# Patient Record
(demographics unavailable — no encounter records)

---

## 2024-11-01 NOTE — HEALTH RISK ASSESSMENT
[No] : In the past 12 months have you used drugs other than those required for medical reasons? No [No falls in past year] : Patient reported no falls in the past year [0] : 2) Feeling down, depressed, or hopeless: Not at all (0) [PHQ-2 Negative - No further assessment needed] : PHQ-2 Negative - No further assessment needed [Never] : Never [Audit-CScore] : 0 [de-identified] : Walks [de-identified] : Normal [[id number]] : 0

## 2024-11-01 NOTE — REVIEW OF SYSTEMS
[Fatigue] : fatigue [Nasal Discharge] : nasal discharge [Postnasal Drip] : postnasal drip [Cough] : cough [Fever] : no fever [Chills] : no chills

## 2024-11-01 NOTE — PHYSICAL EXAM
[Normal Sclera/Conjunctiva] : normal sclera/conjunctiva [Normal Outer Ear/Nose] : the outer ears and nose were normal in appearance [Normal Oropharynx] : the oropharynx was normal [Normal TMs] : both tympanic membranes were normal [Normal Nasal Mucosa] : the nasal mucosa was normal [No Lymphadenopathy] : no lymphadenopathy [Normal] : normal rate, regular rhythm, normal S1 and S2 and no murmur heard [No Edema] : there was no peripheral edema

## 2024-11-01 NOTE — HISTORY OF PRESENT ILLNESS
[FreeTextEntry8] : Congestion started on Monday with chills and fatigue. No aches runny nose a few days later, improved but still tired Coughing at 3am, dry. Took Corcidin which helped Started Fluticasone on Monday  COVID negative Grandson in pre-school with runny nose saw over the weekend

## 2024-11-04 NOTE — ASSESSMENT
[FreeTextEntry1] : due for BD, rx given Has rx for mammo from GYN up to date with colonoscopy Advised flu, covid, and prevnar 20 vaccines

## 2024-11-04 NOTE — HISTORY OF PRESENT ILLNESS
[de-identified] : Patient is a 74F with atrial fibrillation (on Eliquis), allergic asthma, osteopenia, HTN, HLD, prediabetes, colon polyps presents today for  Wellness still with congestion since last monday (now 1 week) On Fluticasone and was on mucinex. Taking claritin, not really helping. cough with some mucous  Ablation was 10/4/24 Dr. lackey at [facility] Will be seeing in 2 weeks  Has been walking for exercise, No resistance  Ortho: Dr. Avina at [facility]  RSV - last year had reaction with elevated HR. Went to [facility] in 2021 had tdap in 2022

## 2024-11-04 NOTE — HISTORY OF PRESENT ILLNESS
[de-identified] : Patient is a 74F with atrial fibrillation (on Eliquis), allergic asthma, osteopenia, HTN, HLD, prediabetes, colon polyps presents today for  Wellness still with congestion since last monday (now 1 week) On Fluticasone and was on mucinex. Taking claritin, not really helping. cough with some mucous  Ablation was 10/4/24 Dr. lackey at [facility] Will be seeing in 2 weeks  Has been walking for exercise, No resistance  Ortho: Dr. Avina at [facility]  RSV - last year had reaction with elevated HR. Went to [facility] in 2021 had tdap in 2022

## 2024-11-04 NOTE — HEALTH RISK ASSESSMENT
[Very Good] : ~his/her~  mood as very good [No] : In the past 12 months have you used drugs other than those required for medical reasons? No [No falls in past year] : Patient reported no falls in the past year [0] : 2) Feeling down, depressed, or hopeless: Not at all (0) [PHQ-2 Negative - No further assessment needed] : PHQ-2 Negative - No further assessment needed [Never] : Never [NO] : No [Patient reported mammogram was normal] : Patient reported mammogram was normal [Patient reported PAP Smear was normal] : Patient reported PAP Smear was normal [Patient reported bone density results were abnormal] : Patient reported bone density results were abnormal [Patient reported colonoscopy was abnormal] : Patient reported colonoscopy was abnormal [HIV test declined] : HIV test declined [Hepatitis C test declined] : Hepatitis C test declined [None] : None [With Family] : lives with family [Retired] : retired [College] : College [] :  [# Of Children ___] : has [unfilled] children [Fully functional (bathing, dressing, toileting, transferring, walking, feeding)] : Fully functional (bathing, dressing, toileting, transferring, walking, feeding) [Fully functional (using the telephone, shopping, preparing meals, housekeeping, doing laundry, using] : Fully functional and needs no help or supervision to perform IADLs (using the telephone, shopping, preparing meals, housekeeping, doing laundry, using transportation, managing medications and managing finances) [Smoke Detector] : smoke detector [Carbon Monoxide Detector] : carbon monoxide detector [Safety elements used in home] : safety elements used in home [Seat Belt] :  uses seat belt [Audit-CScore] : 0 [de-identified] : Walks [de-identified] : Normal [[id number]] : 0 [Change in mental status noted] : No change in mental status noted [Language] : denies difficulty with language [Behavior] : denies difficulty with behavior [Learning/Retaining New Information] : denies difficulty learning/retaining new information [Handling Complex Tasks] : denies difficulty handling complex tasks [Reasoning] : denies difficulty with reasoning [Spatial Ability and Orientation] : denies difficulty with spatial ability and orientation [Reports changes in hearing] : Reports no changes in hearing [Reports changes in vision] : Reports no changes in vision [Reports changes in dental health] : Reports no changes in dental health [MammogramDate] : 10/27/23 [MammogramComments] : in 2 weeks, has rx [PapSmearDate] : 10/22 [BoneDensityDate] : 10/22 [BoneDensityComments] : osteopenia [ColonoscopyDate] : 11/19 [ColonoscopyComments] : 5 year follow up, will do in 2025 Dr. Bolton [de-identified] : Last exam 09/2024 [de-identified] : Last exam 06/2024 [AdvancecareDate] : 05/17/2023

## 2024-11-04 NOTE — PHYSICAL EXAM
[Normal Sclera/Conjunctiva] : normal sclera/conjunctiva [Normal Outer Ear/Nose] : the outer ears and nose were normal in appearance [Normal Oropharynx] : the oropharynx was normal [Normal TMs] : both tympanic membranes were normal [Normal Nasal Mucosa] : the nasal mucosa was normal [No Lymphadenopathy] : no lymphadenopathy [No Edema] : there was no peripheral edema [No Rash] : no rash [Normal] : affect was normal and insight and judgment were intact

## 2024-11-04 NOTE — HEALTH RISK ASSESSMENT
[Very Good] : ~his/her~  mood as very good [No] : In the past 12 months have you used drugs other than those required for medical reasons? No [No falls in past year] : Patient reported no falls in the past year [0] : 2) Feeling down, depressed, or hopeless: Not at all (0) [PHQ-2 Negative - No further assessment needed] : PHQ-2 Negative - No further assessment needed [Never] : Never [NO] : No [Patient reported mammogram was normal] : Patient reported mammogram was normal [Patient reported PAP Smear was normal] : Patient reported PAP Smear was normal [Patient reported bone density results were abnormal] : Patient reported bone density results were abnormal [Patient reported colonoscopy was abnormal] : Patient reported colonoscopy was abnormal [HIV test declined] : HIV test declined [Hepatitis C test declined] : Hepatitis C test declined [None] : None [With Family] : lives with family [Retired] : retired [College] : College [] :  [# Of Children ___] : has [unfilled] children [Fully functional (bathing, dressing, toileting, transferring, walking, feeding)] : Fully functional (bathing, dressing, toileting, transferring, walking, feeding) [Fully functional (using the telephone, shopping, preparing meals, housekeeping, doing laundry, using] : Fully functional and needs no help or supervision to perform IADLs (using the telephone, shopping, preparing meals, housekeeping, doing laundry, using transportation, managing medications and managing finances) [Smoke Detector] : smoke detector [Carbon Monoxide Detector] : carbon monoxide detector [Safety elements used in home] : safety elements used in home [Seat Belt] :  uses seat belt [Audit-CScore] : 0 [de-identified] : Walks [de-identified] : Normal [[id number]] : 0 [Change in mental status noted] : No change in mental status noted [Language] : denies difficulty with language [Behavior] : denies difficulty with behavior [Learning/Retaining New Information] : denies difficulty learning/retaining new information [Handling Complex Tasks] : denies difficulty handling complex tasks [Reasoning] : denies difficulty with reasoning [Spatial Ability and Orientation] : denies difficulty with spatial ability and orientation [Reports changes in hearing] : Reports no changes in hearing [Reports changes in vision] : Reports no changes in vision [Reports changes in dental health] : Reports no changes in dental health [MammogramDate] : 10/27/23 [MammogramComments] : in 2 weeks, has rx [PapSmearDate] : 10/22 [BoneDensityDate] : 10/22 [BoneDensityComments] : osteopenia [ColonoscopyDate] : 11/19 [ColonoscopyComments] : 5 year follow up, will do in 2025 Dr. Bolton [de-identified] : Last exam 09/2024 [de-identified] : Last exam 06/2024 [AdvancecareDate] : 05/17/2023

## 2025-01-21 NOTE — PHYSICAL EXAM
[Chaperone Present] : A chaperone was present in the examining room during all aspects of the physical examination [[id number]] : A chaperone was present during the pelvic exam. [FreeTextEntry2] : Anna [Appropriately responsive] : appropriately responsive [Alert] : alert [No Acute Distress] : no acute distress [Soft] : soft [Non-tender] : non-tender [Non-distended] : non-distended [Oriented x3] : oriented x3 [Examination Of The Breasts] : a normal appearance [No Masses] : no breast masses were palpable [Labia Majora] : normal [Labia Minora] : normal [Atrophy] : atrophy [Uterine Prolapse] : uterine prolapse [No Bleeding] : There was no active vaginal bleeding [Normal] : normal [Tenderness] : nontender [Uterine Adnexae] : non-palpable

## 2025-01-21 NOTE — HISTORY OF PRESENT ILLNESS
[FreeTextEntry1] : 73 y/o PM P2 here for annual.  New patient to me, established with practice.  Current concern is prolapse. Does not want pessary or surgery. Does have some urge incontinence. Never seen Urologist or UroGYN in past.  Due for colonoscopy this year - will call and schedule.  Would like to c/w PAP screening.  Unsure last breast exam.  [Patient reported mammogram was normal] : Patient reported mammogram was normal [Patient reported PAP Smear was normal] : Patient reported PAP Smear was normal [Patient reported bone density results were abnormal] : Patient reported bone density results were abnormal [Patient reported colonoscopy was normal] : Patient reported colonoscopy was normal [Mammogramdate] : 11/24 [TextBox_19] : birads 1 [PapSmeardate] : 10/22 [BoneDensityDate] : 11/24 [TextBox_37] : osteopenia [ColonoscopyDate] : 11/19 [TextBox_43] : recall 5 years

## 2025-06-03 NOTE — HISTORY OF PRESENT ILLNESS
[FreeTextEntry1] : follow up lab results [de-identified] : Patient is a 74F with atrial fibrillation (on Eliquis), allergic asthma, osteopenia, HTN, HLD, prediabetes, colon polyps presents today for follow up of prediabtetes  Ortho: Dr. Avina at [facility] Exercise: walking (20minutes) and bike Diet: Breakfast: Oatmeal (sanjana instant oats) cinnamon, lactaid 2%, tomy peaches, 1/2 banana OR cheerios (plain), total lunch: soup (vegetable barley, pea soup) or salad with chicken snack: watermelon, peach dinner: was eating potatoes daily, now has cauliflower/broccoli/string beans, protein. potato 3 days per week snack: ice cream cone   Seeing EP next week, Dr. Wilcox On metoprolol 50mg daily - makes her sleepy On Eliquis Off diltiazem

## 2025-06-03 NOTE — HEALTH RISK ASSESSMENT
[0] : 2) Feeling down, depressed, or hopeless: Not at all (0) [PHQ-2 Negative - No further assessment needed] : PHQ-2 Negative - No further assessment needed [Never] : Never [0-4] : 0-4 [[id number]] : 0